# Patient Record
Sex: FEMALE | Race: WHITE | URBAN - METROPOLITAN AREA
[De-identification: names, ages, dates, MRNs, and addresses within clinical notes are randomized per-mention and may not be internally consistent; named-entity substitution may affect disease eponyms.]

---

## 2022-03-24 ENCOUNTER — OFFICE VISIT (OUTPATIENT)
Dept: URBAN - METROPOLITAN AREA CLINIC 82 | Facility: CLINIC | Age: 67
End: 2022-03-24
Payer: COMMERCIAL

## 2022-03-24 DIAGNOSIS — H52.4 PRESBYOPIA: Primary | ICD-10-CM

## 2022-03-24 PROCEDURE — 92004 COMPRE OPH EXAM NEW PT 1/>: CPT | Performed by: OPTOMETRIST

## 2022-03-24 ASSESSMENT — VISUAL ACUITY
OS: 20/30
OD: 20/20

## 2022-03-24 ASSESSMENT — KERATOMETRY
OS: 43.88
OD: 45.13

## 2022-03-24 ASSESSMENT — INTRAOCULAR PRESSURE
OS: 17
OD: 14

## 2022-03-24 NOTE — IMPRESSION/PLAN
Impression: Presbyopia: H52.4. Plan: New glasses RX given today. Cataracts account for the patient's complaints. No treatment currently recommended. The patient will monitor vision changes and contact us with any decrease in vision.

## 2024-12-02 ENCOUNTER — OFFICE VISIT (OUTPATIENT)
Dept: URBAN - METROPOLITAN AREA CLINIC 82 | Facility: CLINIC | Age: 69
End: 2024-12-02
Payer: COMMERCIAL

## 2024-12-02 DIAGNOSIS — H52.4 PRESBYOPIA: Primary | ICD-10-CM

## 2024-12-02 PROCEDURE — 92014 COMPRE OPH EXAM EST PT 1/>: CPT | Performed by: OPTOMETRIST

## 2024-12-02 ASSESSMENT — INTRAOCULAR PRESSURE
OD: 17
OS: 19

## 2024-12-02 ASSESSMENT — VISUAL ACUITY
OD: 20/30
OS: 20/30

## 2024-12-02 ASSESSMENT — KERATOMETRY
OS: 43.88
OD: 45.00